# Patient Record
Sex: FEMALE | Race: WHITE | NOT HISPANIC OR LATINO | URBAN - METROPOLITAN AREA
[De-identification: names, ages, dates, MRNs, and addresses within clinical notes are randomized per-mention and may not be internally consistent; named-entity substitution may affect disease eponyms.]

---

## 2021-12-06 ENCOUNTER — INPATIENT (INPATIENT)
Facility: HOSPITAL | Age: 21
LOS: 1 days | Discharge: HOME | DRG: 392 | End: 2021-12-08
Attending: HOSPITALIST | Admitting: HOSPITALIST
Payer: MEDICAID

## 2021-12-06 VITALS
OXYGEN SATURATION: 98 % | DIASTOLIC BLOOD PRESSURE: 93 MMHG | SYSTOLIC BLOOD PRESSURE: 137 MMHG | TEMPERATURE: 98 F | HEART RATE: 132 BPM | RESPIRATION RATE: 18 BRPM

## 2021-12-06 LAB
ALBUMIN SERPL ELPH-MCNC: 3.8 G/DL — SIGNIFICANT CHANGE UP (ref 3.5–5)
ALP SERPL-CCNC: 84 U/L — SIGNIFICANT CHANGE UP (ref 40–120)
ALT FLD-CCNC: 19 U/L DA — SIGNIFICANT CHANGE UP (ref 10–60)
ANION GAP SERPL CALC-SCNC: 10 MMOL/L — SIGNIFICANT CHANGE UP (ref 5–17)
AST SERPL-CCNC: 11 U/L — SIGNIFICANT CHANGE UP (ref 10–40)
BASOPHILS # BLD AUTO: 0.03 K/UL — SIGNIFICANT CHANGE UP (ref 0–0.2)
BASOPHILS NFR BLD AUTO: 0.4 % — SIGNIFICANT CHANGE UP (ref 0–2)
BILIRUB SERPL-MCNC: 0.6 MG/DL — SIGNIFICANT CHANGE UP (ref 0.2–1.2)
BUN SERPL-MCNC: 13 MG/DL — SIGNIFICANT CHANGE UP (ref 7–18)
CALCIUM SERPL-MCNC: 9.2 MG/DL — SIGNIFICANT CHANGE UP (ref 8.4–10.5)
CHLORIDE SERPL-SCNC: 106 MMOL/L — SIGNIFICANT CHANGE UP (ref 96–108)
CO2 SERPL-SCNC: 23 MMOL/L — SIGNIFICANT CHANGE UP (ref 22–31)
CREAT SERPL-MCNC: 0.77 MG/DL — SIGNIFICANT CHANGE UP (ref 0.5–1.3)
D DIMER BLD IA.RAPID-MCNC: <150 NG/ML DDU — SIGNIFICANT CHANGE UP
EOSINOPHIL # BLD AUTO: 0.02 K/UL — SIGNIFICANT CHANGE UP (ref 0–0.5)
EOSINOPHIL NFR BLD AUTO: 0.3 % — SIGNIFICANT CHANGE UP (ref 0–6)
GLUCOSE SERPL-MCNC: 95 MG/DL — SIGNIFICANT CHANGE UP (ref 70–99)
HCG SERPL-ACNC: <1 MIU/ML — SIGNIFICANT CHANGE UP
HCT VFR BLD CALC: 40.4 % — SIGNIFICANT CHANGE UP (ref 34.5–45)
HGB BLD-MCNC: 14.3 G/DL — SIGNIFICANT CHANGE UP (ref 11.5–15.5)
IMM GRANULOCYTES NFR BLD AUTO: 0.1 % — SIGNIFICANT CHANGE UP (ref 0–1.5)
LIDOCAIN IGE QN: 67 U/L — LOW (ref 73–393)
LYMPHOCYTES # BLD AUTO: 1.61 K/UL — SIGNIFICANT CHANGE UP (ref 1–3.3)
LYMPHOCYTES # BLD AUTO: 22.4 % — SIGNIFICANT CHANGE UP (ref 13–44)
MCHC RBC-ENTMCNC: 30.5 PG — SIGNIFICANT CHANGE UP (ref 27–34)
MCHC RBC-ENTMCNC: 35.4 GM/DL — SIGNIFICANT CHANGE UP (ref 32–36)
MCV RBC AUTO: 86.1 FL — SIGNIFICANT CHANGE UP (ref 80–100)
MONOCYTES # BLD AUTO: 0.43 K/UL — SIGNIFICANT CHANGE UP (ref 0–0.9)
MONOCYTES NFR BLD AUTO: 6 % — SIGNIFICANT CHANGE UP (ref 2–14)
NEUTROPHILS # BLD AUTO: 5.08 K/UL — SIGNIFICANT CHANGE UP (ref 1.8–7.4)
NEUTROPHILS NFR BLD AUTO: 70.8 % — SIGNIFICANT CHANGE UP (ref 43–77)
NRBC # BLD: 0 /100 WBCS — SIGNIFICANT CHANGE UP (ref 0–0)
PLATELET # BLD AUTO: 253 K/UL — SIGNIFICANT CHANGE UP (ref 150–400)
POTASSIUM SERPL-MCNC: 3.5 MMOL/L — SIGNIFICANT CHANGE UP (ref 3.5–5.3)
POTASSIUM SERPL-SCNC: 3.5 MMOL/L — SIGNIFICANT CHANGE UP (ref 3.5–5.3)
PROT SERPL-MCNC: 7.9 G/DL — SIGNIFICANT CHANGE UP (ref 6–8.3)
RBC # BLD: 4.69 M/UL — SIGNIFICANT CHANGE UP (ref 3.8–5.2)
RBC # FLD: 11.6 % — SIGNIFICANT CHANGE UP (ref 10.3–14.5)
SODIUM SERPL-SCNC: 139 MMOL/L — SIGNIFICANT CHANGE UP (ref 135–145)
WBC # BLD: 7.18 K/UL — SIGNIFICANT CHANGE UP (ref 3.8–10.5)
WBC # FLD AUTO: 7.18 K/UL — SIGNIFICANT CHANGE UP (ref 3.8–10.5)

## 2021-12-06 PROCEDURE — 99285 EMERGENCY DEPT VISIT HI MDM: CPT

## 2021-12-06 PROCEDURE — 93010 ELECTROCARDIOGRAM REPORT: CPT

## 2021-12-06 RX ORDER — FAMOTIDINE 10 MG/ML
20 INJECTION INTRAVENOUS ONCE
Refills: 0 | Status: COMPLETED | OUTPATIENT
Start: 2021-12-06 | End: 2021-12-06

## 2021-12-06 RX ORDER — SODIUM CHLORIDE 9 MG/ML
1000 INJECTION INTRAMUSCULAR; INTRAVENOUS; SUBCUTANEOUS ONCE
Refills: 0 | Status: COMPLETED | OUTPATIENT
Start: 2021-12-06 | End: 2021-12-06

## 2021-12-06 RX ORDER — ONDANSETRON 8 MG/1
4 TABLET, FILM COATED ORAL ONCE
Refills: 0 | Status: COMPLETED | OUTPATIENT
Start: 2021-12-06 | End: 2021-12-06

## 2021-12-06 RX ADMIN — ONDANSETRON 4 MILLIGRAM(S): 8 TABLET, FILM COATED ORAL at 23:13

## 2021-12-06 RX ADMIN — FAMOTIDINE 20 MILLIGRAM(S): 10 INJECTION INTRAVENOUS at 23:13

## 2021-12-06 RX ADMIN — SODIUM CHLORIDE 1000 MILLILITER(S): 9 INJECTION INTRAMUSCULAR; INTRAVENOUS; SUBCUTANEOUS at 22:31

## 2021-12-06 NOTE — ED ADULT TRIAGE NOTE - CHIEF COMPLAINT QUOTE
as per pt I keep burping/regurgitating and my esophagus feels tight for the past 2-3 days,pt speaks in full sentences.

## 2021-12-06 NOTE — ED PROVIDER NOTE - CLINICAL SUMMARY MEDICAL DECISION MAKING FREE TEXT BOX
Will f/u diagnostics including xray of soft tissue of neck. Will possibly consult GI. Will f/u diagnostics including xray of soft tissue of neck. Will possibly consult GI.  2:55a- Pt with persistent esophageal foreign body sensation. Case dw Dr. Benson (GI) will consult for possible endoscopy.  MAR endorsed. Pt agrees with admission to hospitalist service.  I had a detailed discussion with the patient and/or guardian regarding the historical points, exam findings, and any diagnostic results supporting the admit diagnosis.

## 2021-12-06 NOTE — ED ADULT NURSE NOTE - OBJECTIVE STATEMENT
Patient presented to the ED with c/o  burping, regurgitating and pressure feeling in the throat. As per patient she took Omeprazole before coming to the ED. Patient presented to the ED with c/o burping, regurgitating and pressure feeling in the throat. As per patient she took Omeprazole before coming to the ED.

## 2021-12-06 NOTE — ED ADULT NURSE NOTE - HOW OFTEN DO YOU HAVE A DRINK CONTAINING ALCOHOL?
2793 Bedside and Verbal shift change report given to Tim Hurtado RN (oncoming nurse) by Dayron Porter RN (offgoing nurse). Report included the following information SBAR, Kardex, Intake/Output, MAR and Recent Results. 0710 Epidural line removed at this time. 7773 Patient ambulatory to the bathroom able to void 400ml. 0935 TRANSFER - OUT REPORT:    Verbal report given to MATHEW Graves RN (name) on Carissa Bennett  being transferred to [facility]U (unit) for routine progression of care       Report consisted of patients Situation, Background, Assessment and   Recommendations(SBAR). Information from the following report(s) SBAR, Kardex, Procedure Summary, Intake/Output and MAR was reviewed with the receiving nurse. Lines:   Peripheral IV 11/25/20 Right Forearm (Active)   Site Assessment Clean, dry, & intact 11/27/20 0708   Phlebitis Assessment 0 11/27/20 0708   Infiltration Assessment 0 11/27/20 0708   Dressing Status Clean, dry, & intact 11/27/20 0708   Dressing Type Tape;Transparent 11/27/20 0708   Hub Color/Line Status Infusing 11/27/20 0708        Opportunity for questions and clarification was provided.       Patient transported with:   Registered Nurse Never

## 2021-12-06 NOTE — ED PROVIDER NOTE - OBJECTIVE STATEMENT
20 y/o female (mother present) presents with chief complaint of foreign body sensation to throat x 2 days after eating steak. No chest pain. No shortness of breath. Pt describes having difficulty swallowing liquids and solids, associated with nausea. No vomiting. Pt states was seen by GI yesterday and was prescribed omeprazole, however symptoms are not resolved.

## 2021-12-07 ENCOUNTER — TRANSCRIPTION ENCOUNTER (OUTPATIENT)
Age: 21
End: 2021-12-07

## 2021-12-07 ENCOUNTER — RESULT REVIEW (OUTPATIENT)
Age: 21
End: 2021-12-07

## 2021-12-07 DIAGNOSIS — R10.13 EPIGASTRIC PAIN: ICD-10-CM

## 2021-12-07 DIAGNOSIS — R09.89 OTHER SPECIFIED SYMPTOMS AND SIGNS INVOLVING THE CIRCULATORY AND RESPIRATORY SYSTEMS: ICD-10-CM

## 2021-12-07 DIAGNOSIS — K20.0 EOSINOPHILIC ESOPHAGITIS: ICD-10-CM

## 2021-12-07 DIAGNOSIS — R53.83 OTHER FATIGUE: ICD-10-CM

## 2021-12-07 DIAGNOSIS — Z29.9 ENCOUNTER FOR PROPHYLACTIC MEASURES, UNSPECIFIED: ICD-10-CM

## 2021-12-07 DIAGNOSIS — K22.9 DISEASE OF ESOPHAGUS, UNSPECIFIED: ICD-10-CM

## 2021-12-07 DIAGNOSIS — R13.10 DYSPHAGIA, UNSPECIFIED: ICD-10-CM

## 2021-12-07 LAB
ALBUMIN SERPL ELPH-MCNC: 3.7 G/DL — SIGNIFICANT CHANGE UP (ref 3.5–5)
ALP SERPL-CCNC: 73 U/L — SIGNIFICANT CHANGE UP (ref 40–120)
ALT FLD-CCNC: 18 U/L DA — SIGNIFICANT CHANGE UP (ref 10–60)
AMYLASE P1 CFR SERPL: 73 U/L — SIGNIFICANT CHANGE UP (ref 25–115)
ANION GAP SERPL CALC-SCNC: 7 MMOL/L — SIGNIFICANT CHANGE UP (ref 5–17)
APPEARANCE UR: CLEAR — SIGNIFICANT CHANGE UP
APTT BLD: 32 SEC — SIGNIFICANT CHANGE UP (ref 27.5–35.5)
AST SERPL-CCNC: 10 U/L — SIGNIFICANT CHANGE UP (ref 10–40)
BILIRUB SERPL-MCNC: 0.6 MG/DL — SIGNIFICANT CHANGE UP (ref 0.2–1.2)
BILIRUB UR-MCNC: NEGATIVE — SIGNIFICANT CHANGE UP
BUN SERPL-MCNC: 10 MG/DL — SIGNIFICANT CHANGE UP (ref 7–18)
CALCIUM SERPL-MCNC: 8.8 MG/DL — SIGNIFICANT CHANGE UP (ref 8.4–10.5)
CHLORIDE SERPL-SCNC: 109 MMOL/L — HIGH (ref 96–108)
CO2 SERPL-SCNC: 24 MMOL/L — SIGNIFICANT CHANGE UP (ref 22–31)
COLOR SPEC: YELLOW — SIGNIFICANT CHANGE UP
CREAT SERPL-MCNC: 0.66 MG/DL — SIGNIFICANT CHANGE UP (ref 0.5–1.3)
DIFF PNL FLD: NEGATIVE — SIGNIFICANT CHANGE UP
GLUCOSE SERPL-MCNC: 83 MG/DL — SIGNIFICANT CHANGE UP (ref 70–99)
GLUCOSE UR QL: NEGATIVE — SIGNIFICANT CHANGE UP
HCT VFR BLD CALC: 38 % — SIGNIFICANT CHANGE UP (ref 34.5–45)
HGB BLD-MCNC: 13.2 G/DL — SIGNIFICANT CHANGE UP (ref 11.5–15.5)
INR BLD: 1.19 RATIO — HIGH (ref 0.88–1.16)
KETONES UR-MCNC: ABNORMAL
LEUKOCYTE ESTERASE UR-ACNC: NEGATIVE — SIGNIFICANT CHANGE UP
MCHC RBC-ENTMCNC: 30.6 PG — SIGNIFICANT CHANGE UP (ref 27–34)
MCHC RBC-ENTMCNC: 34.7 GM/DL — SIGNIFICANT CHANGE UP (ref 32–36)
MCV RBC AUTO: 88 FL — SIGNIFICANT CHANGE UP (ref 80–100)
NITRITE UR-MCNC: NEGATIVE — SIGNIFICANT CHANGE UP
NRBC # BLD: 0 /100 WBCS — SIGNIFICANT CHANGE UP (ref 0–0)
PH UR: 5 — SIGNIFICANT CHANGE UP (ref 5–8)
PLATELET # BLD AUTO: 235 K/UL — SIGNIFICANT CHANGE UP (ref 150–400)
POTASSIUM SERPL-MCNC: 3.7 MMOL/L — SIGNIFICANT CHANGE UP (ref 3.5–5.3)
POTASSIUM SERPL-SCNC: 3.7 MMOL/L — SIGNIFICANT CHANGE UP (ref 3.5–5.3)
PROT SERPL-MCNC: 7.1 G/DL — SIGNIFICANT CHANGE UP (ref 6–8.3)
PROT UR-MCNC: 15
PROTHROM AB SERPL-ACNC: 14 SEC — HIGH (ref 10.6–13.6)
RBC # BLD: 4.32 M/UL — SIGNIFICANT CHANGE UP (ref 3.8–5.2)
RBC # FLD: 11.5 % — SIGNIFICANT CHANGE UP (ref 10.3–14.5)
SARS-COV-2 RNA SPEC QL NAA+PROBE: SIGNIFICANT CHANGE UP
SODIUM SERPL-SCNC: 140 MMOL/L — SIGNIFICANT CHANGE UP (ref 135–145)
SP GR SPEC: 1.02 — SIGNIFICANT CHANGE UP (ref 1.01–1.02)
TSH SERPL-MCNC: 1.3 UU/ML — SIGNIFICANT CHANGE UP (ref 0.34–4.82)
UROBILINOGEN FLD QL: NEGATIVE — SIGNIFICANT CHANGE UP
WBC # BLD: 8.17 K/UL — SIGNIFICANT CHANGE UP (ref 3.8–10.5)
WBC # FLD AUTO: 8.17 K/UL — SIGNIFICANT CHANGE UP (ref 3.8–10.5)

## 2021-12-07 PROCEDURE — 43239 EGD BIOPSY SINGLE/MULTIPLE: CPT

## 2021-12-07 PROCEDURE — 70360 X-RAY EXAM OF NECK: CPT | Mod: 26

## 2021-12-07 PROCEDURE — 88312 SPECIAL STAINS GROUP 1: CPT | Mod: 26

## 2021-12-07 PROCEDURE — 99222 1ST HOSP IP/OBS MODERATE 55: CPT | Mod: 25

## 2021-12-07 PROCEDURE — 99223 1ST HOSP IP/OBS HIGH 75: CPT | Mod: 25

## 2021-12-07 PROCEDURE — 88305 TISSUE EXAM BY PATHOLOGIST: CPT | Mod: 26

## 2021-12-07 RX ORDER — PANTOPRAZOLE SODIUM 20 MG/1
40 TABLET, DELAYED RELEASE ORAL DAILY
Refills: 0 | Status: DISCONTINUED | OUTPATIENT
Start: 2021-12-07 | End: 2021-12-07

## 2021-12-07 RX ORDER — SODIUM CHLORIDE 9 MG/ML
1000 INJECTION INTRAMUSCULAR; INTRAVENOUS; SUBCUTANEOUS
Refills: 0 | Status: DISCONTINUED | OUTPATIENT
Start: 2021-12-07 | End: 2021-12-07

## 2021-12-07 RX ORDER — SODIUM CHLORIDE 9 MG/ML
1000 INJECTION INTRAMUSCULAR; INTRAVENOUS; SUBCUTANEOUS
Refills: 0 | Status: DISCONTINUED | OUTPATIENT
Start: 2021-12-07 | End: 2021-12-08

## 2021-12-07 RX ORDER — METOCLOPRAMIDE HCL 10 MG
10 TABLET ORAL ONCE
Refills: 0 | Status: COMPLETED | OUTPATIENT
Start: 2021-12-07 | End: 2021-12-07

## 2021-12-07 RX ORDER — PANTOPRAZOLE SODIUM 20 MG/1
40 TABLET, DELAYED RELEASE ORAL
Refills: 0 | Status: DISCONTINUED | OUTPATIENT
Start: 2021-12-07 | End: 2021-12-08

## 2021-12-07 RX ORDER — PANTOPRAZOLE SODIUM 20 MG/1
1 TABLET, DELAYED RELEASE ORAL
Qty: 30 | Refills: 0
Start: 2021-12-07 | End: 2021-12-21

## 2021-12-07 RX ADMIN — SODIUM CHLORIDE 75 MILLILITER(S): 9 INJECTION INTRAMUSCULAR; INTRAVENOUS; SUBCUTANEOUS at 17:44

## 2021-12-07 RX ADMIN — SODIUM CHLORIDE 75 MILLILITER(S): 9 INJECTION INTRAMUSCULAR; INTRAVENOUS; SUBCUTANEOUS at 10:10

## 2021-12-07 RX ADMIN — Medication 10 MILLIGRAM(S): at 10:33

## 2021-12-07 RX ADMIN — SODIUM CHLORIDE 75 MILLILITER(S): 9 INJECTION INTRAMUSCULAR; INTRAVENOUS; SUBCUTANEOUS at 21:47

## 2021-12-07 RX ADMIN — PANTOPRAZOLE SODIUM 40 MILLIGRAM(S): 20 TABLET, DELAYED RELEASE ORAL at 21:45

## 2021-12-07 RX ADMIN — PANTOPRAZOLE SODIUM 40 MILLIGRAM(S): 20 TABLET, DELAYED RELEASE ORAL at 13:18

## 2021-12-07 NOTE — DISCHARGE NOTE PROVIDER - NSDCCPCAREPLAN_GEN_ALL_CORE_FT
PRINCIPAL DISCHARGE DIAGNOSIS  Diagnosis: Eosinophilic esophagitis  Assessment and Plan of Treatment: You came into the hospital complaining about not being able to swallow properly and having the sensation of something being stuck. We had the GI specialist come in and see you. You had a EGD perfmormed and they took biopsies. Please follow up with them on discharge. Also follow up with your primary care doctor on discharge.       PRINCIPAL DISCHARGE DIAGNOSIS  Diagnosis: Eosinophilic esophagitis  Assessment and Plan of Treatment: You came into the hospital complaining about not being able to swallow properly and having the sensation of something being stuck. We had the GI specialist come in and see you. You had a EGD perfmormed and they took biopsies. Please follow up with Dr Kelley MOTLEY  on discharge. You are recommended to continue on pantoprazole twice daily Also follow up with your primary care doctor on discharge. You may need to see allergist as out patient .       PRINCIPAL DISCHARGE DIAGNOSIS  Diagnosis: Eosinophilic esophagitis  Assessment and Plan of Treatment: You came into the hospital complaining about not being able to swallow properly and having the sensation of something being stuck. We had the GI specialist come in and see you. You had a EGD perfmormed and they took biopsies. Please follow up with Dr Kelley MOTLEY  on discharge. You are recommended to continue on pantoprazole twice daily Also follow up with your primary care doctor on discharge. You may need to see allergist as out patient .      SECONDARY DISCHARGE DIAGNOSES  Diagnosis: Dysphagia  Assessment and Plan of Treatment: You came into the hospital complaining about not being able to swallow properly and having the sensation of something being stuck. We had the GI specialist come in and see you. You had a EGD perfmormed and they took biopsies. Please follow up with Dr Kelley MOTLEY  on discharge. You are recommended to continue on pantoprazole twice daily Also follow up with your primary care doctor on discharge. You may need to see allergist as out patient .

## 2021-12-07 NOTE — PROGRESS NOTE ADULT - PROBLEM SELECTOR PLAN 2
c/o dysphagia to both solids and liquids x 2d after eating steak, no improvement on omeprazole  c/o chest discomfort with swallowing food, decrease appetite, and nausea   prior episodes in the past 2/2 ingesting irritating foods, sensitive to plums, apples, eggs, nuts, and seeds  In ED: EKG nsr, Electrolytes wnl, no leukocytosis s/p 1 dose Zofran and Pepsid   Physical Exam: No swollen tonsils or exudates. No shotty LN  CXR shows no foreign body, but shows slight narrowing upper 1/3 esophagus   GI Dr. Benson consulted, per ED provider   Endoscopy as above.   c/w pantoprazole BID

## 2021-12-07 NOTE — H&P ADULT - PROBLEM SELECTOR PLAN 4
IMPROVE VTE Individual Risk Assessment  RISK                                                                Points  [  ] Previous VTE                                                  3  [  ] Thrombophilia                                               2  [  ] Lower limb paralysis                                      2        (unable to hold up >15 seconds)    [  ] Current Cancer                                              2         (within 6 months)  [  ] Immobilization > 24 hrs                                1  [  ] ICU/CCU stay > 24 hours                              1  [  ] Age > 60                                                      1  IMPROVE VTE Score ____0_____    Hold DVT ppx for EGD in the AM   Pantoprazole IV daily for GI ppx IMPROVE VTE Individual Risk Assessment  RISK                                                                Points  [  ] Previous VTE                                                  3  [  ] Thrombophilia                                               2  [  ] Lower limb paralysis                                      2        (unable to hold up >15 seconds)    [  ] Current Cancer                                              2         (within 6 months)  [ x ] Immobilization > 24 hrs                                1  [  ] ICU/CCU stay > 24 hours                              1  [  ] Age > 60                                                      1  IMPROVE VTE Score ____1____    Hold DVT ppx for EGD in the AM   Pantoprazole IV daily for GI ppx c/o fatigue and anxiety with notable chills  denies weight loss, fevers, and night sweats  no history of anxiety, not taking any medications  f/u TSH

## 2021-12-07 NOTE — DISCHARGE NOTE PROVIDER - HOSPITAL COURSE
Patient is a 21 year female coming from home lives with family with no significant PMHx presents with difficulty swallowing and dyspepsia with nausea for x 2 days. Patient states that she felt really nervous after eating a small-bite sized, shredded piece of steak, denies sharp pieces or jagged edges. Patient endorses feeling as if she still has a lump in her throat, however states she swallowed the whole piece. In addition to esophageal discomfort, she notes a decrease in appetite to solid and liquid foods stating that when she eats she has associated throat and chest discomfort as the food goes down with reflux and burping that follows, which improves when she is sitting upright, denies cough and vomiting. Patient states previous episodes of this happening after eating irritating foods to which she notes she has has many food sensitivities. Of note, patient also endorses feeling tired recently with increased feelings of anxiety with chills, but denies fevers. Patient denies HA, changes in vision, chest pain, SOB, abdominal pain, or changes in urination or bowel movements.        Patient is a 21 year female coming from home lives with family with no significant PMHx presents with difficulty swallowing and dyspepsia with nausea for x 2 days. Patient states that she felt really nervous after eating a small-bite sized, shredded piece of steak, denies sharp pieces or jagged edges. Patient endorses feeling as if she still has a lump in her throat, however states she swallowed the whole piece. In addition to esophageal discomfort, she notes a decrease in appetite to solid and liquid foods stating that when she eats she has associated throat and chest discomfort as the food goes down with reflux and burping that follows, which improves when she is sitting upright, denies cough and vomiting. Patient states previous episodes of this happening after eating irritating foods to which she notes she has has many food sensitivities. Of note, patient also endorses feeling tired recently with increased feelings of anxiety with chills, but denies fevers. Patient denies HA, changes in vision, chest pain, SOB, abdominal pain, or changes in urination or bowel movements. She had an EGD performed which showed:   Esophagus Mucosa There were diffuse longitudinal linear furrows, friability, and    concentric rings throughout the esophagus suspicious for eosinophilic    esophagitis. There was mild stricturing, particularly at 20 cm from the incisors    with moderate resistance encountered with an upper endoscope. An XP scope was    used for the rest of the exam. Biopsies were taken from the proximal esophagus    with a cold forceps for histology.     Stomach Normal stomach.     Duodenum Normal duodenum.     Patient is stable for discharge. Patient has been advised to follow up as outpatient. Case has been discussed with the attending. This is just a summary of the case. For further information, please refer to patient chart document.

## 2021-12-07 NOTE — H&P ADULT - ASSESSMENT
Patient is a 21 year female coming from home lives with family with no significant PMHx presents with difficulty swallowing and dyspepsia with nausea for x 2 days. Pt is admitted for dysphagia possible esophageal foreign body for EGD eval.

## 2021-12-07 NOTE — CONSULT NOTE ADULT - ATTENDING COMMENTS
Patient seen and examined. 21F presenting for globus sensation. States ate steak 2 days ago and since, has had some globus sensation. Was able to tolerate solid food (cereal) and went down but felt gas/burping, no vomiting. Appears comfortable on exam, tolerating secretions, anxious but no distress. Abd exam benign, soft, NTND, no crepitus on exam. Ddx includes esophagitis, stricture, EOE, etc. Given ability to tolerate solid food post ingestion, lower suspicion for ongoing food impaction. Maintain NPO. PPI IV BID. EGD today.

## 2021-12-07 NOTE — H&P ADULT - ATTENDING COMMENTS
Pt seen at bedside.  Case discussed with MAR.  21 year old lady with no PMH of note Pt seen at bedside.  Case discussed with MAR.  21 year old lady with no PMH of note except fro remote hx of dysphagia presenting with chest discomfort with swallowing over the last 2 days. This started after a meal of shredded steak meat, Denies SOB but continues to experience a FB sensation in her throat.    Vital Signs Last 24 Hrs  T(C): 36.6 (07 Dec 2021 05:00), Max: 36.9 (06 Dec 2021 21:45)  T(F): 97.9 (07 Dec 2021 05:00), Max: 98.5 (06 Dec 2021 21:45)  HR: 80 (07 Dec 2021 05:00) (80 - 132)  BP: 119/79 (07 Dec 2021 05:00) (112/75 - 137/93)  RR: 18 (07 Dec 2021 05:00) (18 - 18)  SpO2: 99% (07 Dec 2021 05:00) (98% - 99%)    Labs                        13.2   8.17  )-----------( 235      ( 07 Dec 2021 05:52 )             38.0   INR 1.19    12-07    140  |  109<H>  |  10  ----------------------------<  83  3.7   |  24  |  0.66    Ca    8.8      07 Dec 2021 05:52    TPro  7.1  /  Alb  3.7  /  TBili  0.6  /  DBili  x   /  AST  10  /  ALT  18  /  AlkPhos  73  12-07    Neck X ray - anterior /lateral - pending final read    Impression   - Suspected Foreign body impaction in the esophagus  - Dysphagia and odynophagia  - Concern for esophageal rings etc    Plan   - Admit to Medicine for GI evaluation   - NO respiratory distress requiring acute intervention  - GI consult DR Benson consulted by the ED team  - Monitor closely

## 2021-12-07 NOTE — PROGRESS NOTE ADULT - NUTRITIONAL ASSESSMENT
Diet, NPO:   NPO for Procedure/Test     NPO Start Date: 07-Dec-2021,   NPO Start Time: 05:00  Except Medications  With Ice Chips/Sips of Water (12-07-21 @ 05:03) [Active]

## 2021-12-07 NOTE — H&P ADULT - NSHPSOCIALHISTORY_GEN_ALL_CORE
Patient lives at home with family, ambulates independently. Mother is at the bedside.    Patient denies smoking history.  Patient denies ETOH use.  Patient denies illicit use of drugs.

## 2021-12-07 NOTE — DISCHARGE NOTE PROVIDER - CARE PROVIDER_API CALL
Evgeny Benson)  Gastroenterology; Internal Medicine  [address] [facility] [address] [place] [address]  [place] [address]  Phone: [phone]  Fax: [phone]  Follow Up Time:

## 2021-12-07 NOTE — H&P ADULT - PROBLEM SELECTOR PLAN 3
c/o fatigue and anxiety with notable chills  denies weight loss, fevers, and night sweats  no history of anxiety, not taking any medications  f/u TSH management as above

## 2021-12-07 NOTE — H&P ADULT - PROBLEM SELECTOR PLAN 5
IMPROVE VTE Individual Risk Assessment  RISK                                                                Points  [  ] Previous VTE                                                  3  [  ] Thrombophilia                                               2  [  ] Lower limb paralysis                                      2        (unable to hold up >15 seconds)    [  ] Current Cancer                                              2         (within 6 months)  [ x ] Immobilization > 24 hrs                                1  [  ] ICU/CCU stay > 24 hours                              1  [  ] Age > 60                                                      1  IMPROVE VTE Score ____1____    Hold DVT ppx for EGD in the AM   Pantoprazole IV daily for GI ppx

## 2021-12-07 NOTE — DISCHARGE NOTE PROVIDER - NSDCMRMEDTOKEN_GEN_ALL_CORE_FT
pantoprazole 40 mg oral delayed release tablet: 1 tab(s) orally 2 times a day    pantoprazole 40 mg oral delayed release tablet: 1 tab(s) orally 2 times a day   Reglan 10 mg oral tablet: 1 tab(s) orally 2 times a day

## 2021-12-07 NOTE — H&P ADULT - PROBLEM SELECTOR PLAN 2
management as above c/o dysphagia to both solids and liquids x 2d after eating steak, no improvement on omeprazole  c/o chest discomfort with swallowing food, decrease appetite, and nausea   prior episodes in the past 2/2 ingesting irritating foods, sensitive to plums, apples, eggs, nuts, and seeds  In ED: EKG nsr, Electrolytes wnl, no leukocytosis s/p 1 dose Zofran and Pepsid   Physical Exam: No swollen tonsils or exudates. No shotty LN  CXR shows no foreign body, but shows slight narrowing upper 1/3 esophagus (f/u official read)  GI Dr. Benson consulted, per ED provider   Possible Endoscopy in AM, will remain NPO  c/w pantoprazole IV push daily

## 2021-12-07 NOTE — H&P ADULT - NSHPPHYSICALEXAM_GEN_ALL_CORE
Vital Signs Last 24 Hrs  T(C): 36.6 (07 Dec 2021 05:00), Max: 36.9 (06 Dec 2021 21:45)  T(F): 97.9 (07 Dec 2021 05:00), Max: 98.5 (06 Dec 2021 21:45)  HR: 80 (07 Dec 2021 05:00) (80 - 132)  BP: 119/79 (07 Dec 2021 05:00) (112/75 - 137/93)  BP(mean): --  RR: 18 (07 Dec 2021 05:00) (18 - 18)  SpO2: 99% (07 Dec 2021 05:00) (98% - 99%)      GENERAL: NAD, lying in bed comfortably  HEAD:  Atraumatic, Normocephalic  EYES: EOMI, PERRLA, conjunctiva and sclera clear  ENT: Moist mucous membranes. No shotty LN. No enlarged tonsils or exudates.  NECK: Supple, No JVD. No Goiter.  CHEST/LUNG: Clear to auscultation bilaterally. Unlabored respirations  HEART: Regular rate and rhythm; No murmurs, rubs, or gallops  ABDOMEN: Bowel sounds present; Soft, Nontender, Nondistended.  EXTREMITIES:  2+ Peripheral Pulses, brisk capillary refill. No clubbing, cyanosis, or edema  NERVOUS SYSTEM:  Alert & Oriented X3, speech clear. No deficits   MSK: FROM all 4 extremities, full and equal strength  SKIN: No rashes or lesions Vital Signs Last 24 Hrs  T(C): 36.6 (07 Dec 2021 05:00), Max: 36.9 (06 Dec 2021 21:45)  T(F): 97.9 (07 Dec 2021 05:00), Max: 98.5 (06 Dec 2021 21:45)  HR: 80 (07 Dec 2021 05:00) (80 - 132)  BP: 119/79 (07 Dec 2021 05:00) (112/75 - 137/93)  BP(mean): --  RR: 18 (07 Dec 2021 05:00) (18 - 18)  SpO2: 99% (07 Dec 2021 05:00) (98% - 99%)      GENERAL: NAD, lying in bed comfortably  HEAD:  Atraumatic, Normocephalic  EYES: EOMI, PERRLA, conjunctiva and sclera clear  ENT: Moist mucous membranes. No shotty LN. No enlarged tonsils or exudates.  NECK: Supple, No JVD. No Goiter.  CHEST/LUNG: mild rhonchi in lower lobes bilaterally. Unlabored respirations  HEART: Regular rate and rhythm; No murmurs, rubs, or gallops  ABDOMEN: Bowel sounds present; Soft, Nontender, Nondistended.  EXTREMITIES:  2+ Peripheral Pulses, brisk capillary refill. No clubbing, cyanosis, or edema  NERVOUS SYSTEM:  Alert & Oriented X3, speech clear. No deficits   MSK: FROM all 4 extremities, full and equal strength  SKIN: No rashes or lesions Vital Signs Last 24 Hrs  T(C): 36.6 (07 Dec 2021 05:00), Max: 36.9 (06 Dec 2021 21:45)  T(F): 97.9 (07 Dec 2021 05:00), Max: 98.5 (06 Dec 2021 21:45)  HR: 80 (07 Dec 2021 05:00) (80 - 132)  BP: 119/79 (07 Dec 2021 05:00) (112/75 - 137/93)  BP(mean): --  RR: 18 (07 Dec 2021 05:00) (18 - 18)  SpO2: 99% (07 Dec 2021 05:00) (98% - 99%)      GENERAL: NAD, lying in bed comfortably  HEAD:  Atraumatic, Normocephalic  EYES: EOMI, PERRLA, conjunctiva and sclera clear  ENT: Moist mucous membranes. No shotty LN. No enlarged tonsils or exudates.  NECK: Supple, No JVD. No Goiter.  CHEST/LUNG: clear lungs bilaterally. Unlabored respirations  HEART: Regular rate and rhythm; No murmurs, rubs, or gallops  ABDOMEN: Bowel sounds present; Soft, Nontender, Nondistended.  EXTREMITIES:  2+ Peripheral Pulses, brisk capillary refill. No clubbing, cyanosis, or edema  NERVOUS SYSTEM:  Alert & Oriented X3, speech clear. No deficits   MSK: FROM all 4 extremities, full and equal strength  SKIN: No rashes or lesions

## 2021-12-07 NOTE — PATIENT PROFILE ADULT - FALL HARM RISK - UNIVERSAL INTERVENTIONS
Bed in lowest position, wheels locked, appropriate side rails in place/Call bell, personal items and telephone in reach/Instruct patient to call for assistance before getting out of bed or chair/Non-slip footwear when patient is out of bed/[place] to call system/Physically safe environment - no spills, clutter or unnecessary equipment/Purposeful Proactive Rounding/Room/bathroom lighting operational, light cord in reach

## 2021-12-07 NOTE — H&P ADULT - HISTORY OF PRESENT ILLNESS
Patient is a 21 year female coming from home lives with family with no significant PMHx presents with difficulty swallowing and dyspepsia with nausea for x 2 days. Patient states that she felt really nervous after eating a small-bite sized, shredded piece of steak, denies sharp pieces or jagged edges. Patient endorses feeling as if she still has a lump in her throat, however states she swallowed the whole piece. In addition to esophageal discomfort, she notes a decrease in appetite to solid and liquid foods stating that when she eats she has associated throat and chest discomfort as the food goes down with reflux and burping that follows, which improves when she is sitting upright, denies cough and vomiting. Patient states previous episodes of this happening after eating irritating foods to which she notes she has has many food sensitivities. Of note, patient also endorses feeling tired recently with increased feelings of anxiety with chills, but denies fevers. Patient denies HA, changes in vision, chest pain, SOB, abdominal pain, or changes in urination or bowel movements.

## 2021-12-07 NOTE — CONSULT NOTE ADULT - SUBJECTIVE AND OBJECTIVE BOX
[facility] GI CONSULTATION    Patient is a 21y old  Female who presents with a chief complaint of Dysphagia (07 Dec 2021 03:50)    HPI:  Patient is a 21 year female coming from home lives with family with no significant PMHx presents with difficulty swallowing and dyspepsia with nausea for x 2 days. Patient states that she felt really nervous after eating a small-bite sized, shredded piece of steak, denies sharp pieces or jagged edges. Patient endorses feeling as if she still has a lump in her throat, however states she swallowed the whole piece. In addition to esophageal discomfort, she notes a decrease in appetite to solid and liquid foods stating that when she eats she has associated throat and chest discomfort as the food goes down with reflux and burping that follows, which improves when she is sitting upright, denies cough and vomiting. Patient states previous episodes of this happening after eating irritating foods to which she notes she has has many food sensitivities. Of note, patient also endorses feeling tired recently with increased feelings of anxiety with chills, but denies fevers. Patient denies HA, changes in vision, chest pain, SOB, abdominal pain, or changes in urination or bowel movements.      (07 Dec 2021 03:50)    PMH/PSH:  PAST MEDICAL & SURGICAL HISTORY:  No pertinent past medical history      FH:  FAMILY HISTORY:      MEDS:  MEDICATIONS  (STANDING):  pantoprazole  Injectable 40 milliGRAM(s) IV Push daily    MEDICATIONS  (PRN):    Allergies    Nuts (Rash)  sulfa (Rash)    Intolerances            CONSTITUTIONAL:  No weight loss, fever, chills, weakness or fatigue.  HEENT:  Eyes:  No visual loss, blurred vision, double vision or yellow sclerae. Ears, Nose, Throat:  No hearing loss, sneezing, congestion, runny nose or sore throat.  SKIN:  No rash or itching.  CARDIOVASCULAR:  No chest pain, chest pressure or chest discomfort. No palpitations or edema.  RESPIRATORY:  No shortness of breath, cough or sputum.  GASTROINTESTINAL:  SEE HPI  GENITOURINARY:  No dysuria, hematuria, urinary frequency  NEUROLOGICAL:  No headache, dizziness, syncope, paralysis, ataxia, numbness or tingling in the extremities. No change in bowel or bladder control.  MUSCULOSKELETAL:  No muscle, back pain, joint pain or stiffness.  HEMATOLOGIC:  No anemia, bleeding or bruising.  LYMPHATICS:  No enlarged nodes. No history of splenectomy.  PSYCHIATRIC:  No history of depression or anxiety.  ENDOCRINOLOGIC:  No reports of sweating, cold or heat intolerance. No polyuria or polydipsia.    GI HPI: Patient seen and examined lying in bed in NAD. Denies chest pain, dizziness, SOB or palpitations. No abdominal pain. + nausea. No vomiting. Patient reports a feeling of pressure like sensation in her throat. She states she had an endoscopy about three years ago and thinks she was found to have EOE. She was recently started on Omeprazole.    ______________________________________________________________________  PHYSICAL EXAM:  T(C): 36.7 (12-07-21 @ 07:15), Max: 36.9 (12-06-21 @ 21:45)  HR: 86 (12-07-21 @ 07:15)  BP: 104/62 (12-07-21 @ 07:15)  RR: 18 (12-07-21 @ 07:15)  SpO2: 97% (12-07-21 @ 07:15)  Wt(kg): --      GEN: NAD, normocephalic  CVS: S1S2+  CHEST: clear to auscultation  ABD: soft , nontender, nondistended, bowel sounds present, no rebound, no guarding  EXTR: no cyanosis, no clubbing, no edema  NEURO: Awake and alert; non-focal   SKIN:  warm;  non icteric    ______________________________________________________________________  LABS:                        13.2   8.17  )-----------( 235      ( 07 Dec 2021 05:52 )             38.0     12-07    140  |  109<H>  |  10  ----------------------------<  83  3.7   |  24  |  0.66    Ca    8.8      07 Dec 2021 05:52    TPro  7.1  /  Alb  3.7  /  TBili  0.6  /  DBili  x   /  AST  10  /  ALT  18  /  AlkPhos  73  12-07    LIVER FUNCTIONS - ( 07 Dec 2021 05:52 )  Alb: 3.7 g/dL / Pro: 7.1 g/dL / ALK PHOS: 73 U/L / ALT: 18 U/L DA / AST: 10 U/L / GGT: x           PT/INR - ( 07 Dec 2021 05:52 )   PT: 14.0 sec;   INR: 1.19 ratio         PTT - ( 07 Dec 2021 05:52 )  PTT:32.0 sec

## 2021-12-07 NOTE — DISCHARGE NOTE PROVIDER - ATTENDING COMMENTS
tolerating po well. spoke to patient and her mother at bedside and explained in detail all care treatment and further plan  time spent-35 minutes

## 2021-12-07 NOTE — CONSULT NOTE ADULT - ASSESSMENT
GI asked to evaluate this 21 year female with no significant past medical or surgical history presents with difficulty swallowing and dyspepsia with nausea for x 2 days. Patient states she has a pressure like sensation in her throat since eating steak a couple of days ago.

## 2021-12-07 NOTE — H&P ADULT - PROBLEM SELECTOR PLAN 1
c/o dysphagia to both solids and liquids x 2d after eating steak, no improvement on omeprazole  c/o chest discomfort with swallowing food, decrease appetite, and nausea   prior episodes in the past 2/2 ingesting irritating foods, sensitive to plums, apples, eggs, nuts, and seeds  In ED: EKG nsr, Electrolytes wnl, no leukocytosis s/p 1 dose Zofran and Pepsid   Physical Exam: No swollen tonsils or exudates. No shotty LN  CXR shows no foreign body, but shows slight narrowing upper 1/3 esophagus (f/u official read)  GI Dr. Benson consulted, per ED provider   Endoscopy in AM, will remain NPO  c/w pantoprazole IV push daily c/o dysphagia to both solids and liquids x 2d after eating steak, no improvement on omeprazole  c/o chest discomfort with swallowing food, decrease appetite, and nausea   prior episodes in the past 2/2 ingesting irritating foods, sensitive to plums, apples, eggs, nuts, and seeds  In ED: EKG nsr, Electrolytes wnl, no leukocytosis s/p 1 dose Zofran and Pepsid   Physical Exam: No swollen tonsils or exudates. No shotty LN  CXR shows no foreign body, but shows slight narrowing upper 1/3 esophagus (f/u official read)  GI Dr. Benson consulted, per ED provider   Possible Endoscopy in AM, will remain NPO  c/w pantoprazole IV push daily

## 2021-12-07 NOTE — CONSULT NOTE ADULT - PROBLEM SELECTOR RECOMMENDATION 9
+ dysphagia  keep NPO  c/w PPI  plan for EGD today + dysphagia/globus sensation  keep NPO  c/w PPI  plan for EGD today

## 2021-12-07 NOTE — H&P ADULT - NSHPLABSRESULTS_GEN_ALL_CORE
12-06    139  |  106  |  13  ----------------------------<  95  3.5   |  23  |  0.77    Ca    9.2      06 Dec 2021 22:40    TPro  7.9  /  Alb  3.8  /  TBili  0.6  /  DBili  x   /  AST  11  /  ALT  19  /  AlkPhos  84  12-06                            14.3   7.18  )-----------( 253      ( 06 Dec 2021 22:40 )             40.4

## 2021-12-08 ENCOUNTER — TRANSCRIPTION ENCOUNTER (OUTPATIENT)
Age: 21
End: 2021-12-08

## 2021-12-08 VITALS
DIASTOLIC BLOOD PRESSURE: 68 MMHG | SYSTOLIC BLOOD PRESSURE: 107 MMHG | HEART RATE: 74 BPM | TEMPERATURE: 98 F | RESPIRATION RATE: 15 BRPM | OXYGEN SATURATION: 96 %

## 2021-12-08 PROCEDURE — 84702 CHORIONIC GONADOTROPIN TEST: CPT

## 2021-12-08 PROCEDURE — 85610 PROTHROMBIN TIME: CPT

## 2021-12-08 PROCEDURE — 85025 COMPLETE CBC W/AUTO DIFF WBC: CPT

## 2021-12-08 PROCEDURE — 87635 SARS-COV-2 COVID-19 AMP PRB: CPT

## 2021-12-08 PROCEDURE — 81001 URINALYSIS AUTO W/SCOPE: CPT

## 2021-12-08 PROCEDURE — 83690 ASSAY OF LIPASE: CPT

## 2021-12-08 PROCEDURE — 85027 COMPLETE CBC AUTOMATED: CPT

## 2021-12-08 PROCEDURE — 93005 ELECTROCARDIOGRAM TRACING: CPT

## 2021-12-08 PROCEDURE — 99232 SBSQ HOSP IP/OBS MODERATE 35: CPT

## 2021-12-08 PROCEDURE — 36415 COLL VENOUS BLD VENIPUNCTURE: CPT

## 2021-12-08 PROCEDURE — 96374 THER/PROPH/DIAG INJ IV PUSH: CPT

## 2021-12-08 PROCEDURE — 85379 FIBRIN DEGRADATION QUANT: CPT

## 2021-12-08 PROCEDURE — 84443 ASSAY THYROID STIM HORMONE: CPT

## 2021-12-08 PROCEDURE — 85730 THROMBOPLASTIN TIME PARTIAL: CPT

## 2021-12-08 PROCEDURE — 80053 COMPREHEN METABOLIC PANEL: CPT

## 2021-12-08 PROCEDURE — 96375 TX/PRO/DX INJ NEW DRUG ADDON: CPT

## 2021-12-08 PROCEDURE — 70360 X-RAY EXAM OF NECK: CPT

## 2021-12-08 PROCEDURE — 82150 ASSAY OF AMYLASE: CPT

## 2021-12-08 PROCEDURE — 99285 EMERGENCY DEPT VISIT HI MDM: CPT | Mod: 25

## 2021-12-08 PROCEDURE — 99239 HOSP IP/OBS DSCHRG MGMT >30: CPT | Mod: GC

## 2021-12-08 PROCEDURE — 88312 SPECIAL STAINS GROUP 1: CPT

## 2021-12-08 PROCEDURE — 88305 TISSUE EXAM BY PATHOLOGIST: CPT

## 2021-12-08 RX ORDER — METOCLOPRAMIDE HCL 10 MG
1 TABLET ORAL
Qty: 30 | Refills: 0
Start: 2021-12-08 | End: 2021-12-22

## 2021-12-08 RX ADMIN — SODIUM CHLORIDE 75 MILLILITER(S): 9 INJECTION INTRAMUSCULAR; INTRAVENOUS; SUBCUTANEOUS at 06:52

## 2021-12-08 RX ADMIN — PANTOPRAZOLE SODIUM 40 MILLIGRAM(S): 20 TABLET, DELAYED RELEASE ORAL at 06:52

## 2021-12-08 NOTE — PROGRESS NOTE ADULT - SUBJECTIVE AND OBJECTIVE BOX
GI PROGRESS NOTE    Patient is a 21y old  Female who presents with a chief complaint of Dysphagia (07 Dec 2021 15:20)      HPI:  Patient is a 21 year female coming from home lives with family with no significant PMHx presents with difficulty swallowing and dyspepsia with nausea for x 2 days. Patient states that she felt really nervous after eating a small-bite sized, shredded piece of steak, denies sharp pieces or jagged edges. Patient endorses feeling as if she still has a lump in her throat, however states she swallowed the whole piece. In addition to esophageal discomfort, she notes a decrease in appetite to solid and liquid foods stating that when she eats she has associated throat and chest discomfort as the food goes down with reflux and burping that follows, which improves when she is sitting upright, denies cough and vomiting. Patient states previous episodes of this happening after eating irritating foods to which she notes she has has many food sensitivities. Of note, patient also endorses feeling tired recently with increased feelings of anxiety with chills, but denies fevers. Patient denies HA, changes in vision, chest pain, SOB, abdominal pain, or changes in urination or bowel movements.      (07 Dec 2021 03:50)          ______________________________________________________________________  PMH/PSH:  PAST MEDICAL & SURGICAL HISTORY:  No pertinent past medical history      ______________________________________________________________________  MEDS:  MEDICATIONS  (STANDING):  pantoprazole    Tablet 40 milliGRAM(s) Oral two times a day  sodium chloride 0.9%. 1000 milliLiter(s) (75 mL/Hr) IV Continuous <Continuous>    MEDICATIONS  (PRN):    ______________________________________________________________________  ALL:   Allergies    Nuts (Rash)  sulfa (Rash)    Intolerances      ______________________________________________________________________  SH: Social History:  Patient lives at home with family, ambulates independently. Mother is at the bedside.    Patient denies smoking history.  Patient denies ETOH use.  Patient denies illicit use of drugs. (07 Dec 2021 03:50)    ______________________________________________________________________  FH:  FAMILY HISTORY:    ______________________________________________________________________  ROS:    CONSTITUTIONAL:  No weight loss, fever, chills, weakness or fatigue.    HEENT:  Eyes:  No visual loss, blurred vision, double vision or yellow sclerae. Ears, Nose, Throat:  No hearing loss, sneezing, congestion, runny nose or sore throat.    SKIN:  No rash or itching.    CARDIOVASCULAR:  No chest pain, chest pressure or chest discomfort. No palpitations or edema.    RESPIRATORY:  No shortness of breath, cough or sputum.    GASTROINTESTINAL:  SEE HPI    GENITOURINARY:  No dysuria, hematuria, urinary frequency    NEUROLOGICAL:  No headache, dizziness, syncope, paralysis, ataxia, numbness or tingling in the extremities. No change in bowel or bladder control.    MUSCULOSKELETAL:  No muscle, back pain, joint pain or stiffness.    HEMATOLOGIC:  No anemia, bleeding or bruising.    LYMPHATICS:  No enlarged nodes. No history of splenectomy.    PSYCHIATRIC:  No history of depression or anxiety.    ENDOCRINOLOGIC:  No reports of sweating, cold or heat intolerance. No polyuria or polydipsia.    ALLERGIES:  No history of asthma, hives, eczema or rhinitis.    GI HPI: Patient seen and examined lying in bed in NAD. Tolerating clear liquids. States still has pressure like sensation when swallowing. No nausea or vomiting. No abdominal pain, no gas or burping.  ______________________________________________________________________  PHYSICAL EXAM:  T(C): 36.7 (12-08-21 @ 05:26), Max: 36.8 (12-07-21 @ 10:51)  HR: 74 (12-08-21 @ 05:26)  BP: 107/68 (12-08-21 @ 05:26)  RR: 15 (12-08-21 @ 05:26)  SpO2: 96% (12-08-21 @ 05:26)  Wt(kg): --      GEN: NAD, anxious  CVS- S1 S2  ABD: soft nontender, non distended, bowel sounds+  LUNGS: clear to auscultation  NEURO: non focal neuro exam;   Extremities: no cyanosis, no calf tenderness, no edema, no clubbing      ______________________________________________________________________  LABS:                        13.2   8.17  )-----------( 235      ( 07 Dec 2021 05:52 )             38.0     12-07    140  |  109<H>  |  10  ----------------------------<  83  3.7   |  24  |  0.66    Ca    8.8      07 Dec 2021 05:52    TPro  7.1  /  Alb  3.7  /  TBili  0.6  /  DBili  x   /  AST  10  /  ALT  18  /  AlkPhos  73  12-07    LIVER FUNCTIONS - ( 07 Dec 2021 05:52 )  Alb: 3.7 g/dL / Pro: 7.1 g/dL / ALK PHOS: 73 U/L / ALT: 18 U/L DA / AST: 10 U/L / GGT: x           ______________________________________________________________________        
PGY-1 Progress Note discussed with attending    PAGER #: [phone]] TILL 5:00 PM  PLEASE CONTACT ON CALL TEAM:  - On Call Team (Please refer to Delmis) FROM 5:00 PM - 8:30PM  - Nightfloat Team FROM 8:30 -7:30 AM    CHIEF COMPLAINT & BRIEF HOSPITAL COURSE: HPI:  Patient is a 21 year female coming from home lives with family with no significant PMHx presents with difficulty swallowing and dyspepsia with nausea for x 2 days. Patient states that she felt really nervous after eating a small-bite sized, shredded piece of steak, denies sharp pieces or jagged edges. Patient endorses feeling as if she still has a lump in her throat, however states she swallowed the whole piece. In addition to esophageal discomfort, she notes a decrease in appetite to solid and liquid foods stating that when she eats she has associated throat and chest discomfort as the food goes down with reflux and burping that follows, which improves when she is sitting upright, denies cough and vomiting. Patient states previous episodes of this happening after eating irritating foods to which she notes she has has many food sensitivities. Of note, patient also endorses feeling tired recently with increased feelings of anxiety with chills, but denies fevers. Patient denies HA, changes in vision, chest pain, SOB, abdominal pain, or changes in urination or bowel movements.      (07 Dec 2021 03:50)      INTERVAL HPI/OVERNIGHT EVENTS: Patient was examined while she was lying in bed, Aox3, responding appropriately to questions, in NAD. Pt denied any chest pain, shortness of breath, nausea, vomiting or abdominal pain.       REVIEW OF SYSTEMS:  CONSTITUTIONAL: No fever, weight loss, or fatigue  RESPIRATORY: No cough, wheezing, chills or hemoptysis; No shortness of breath  CARDIOVASCULAR: No chest pain, palpitations, dizziness, or leg swelling  GASTROINTESTINAL: No abdominal pain. No  vomiting, or hematemesis; No diarrhea or constipation. No melena or hematochezia. Nausea, Sensation of something stuck in throat.   GENITOURINARY: No dysuria or hematuria, urinary frequency  NEUROLOGICAL: No headaches, memory loss, loss of strength, numbness, or tremors  SKIN: No itching, burning, rashes, or lesions     Vital Signs Last 24 Hrs  T(C): 36.7 (07 Dec 2021 12:31), Max: 36.9 (06 Dec 2021 21:45)  T(F): 98.1 (07 Dec 2021 12:31), Max: 98.5 (06 Dec 2021 21:45)  HR: 94 (07 Dec 2021 12:31) (80 - 132)  BP: 108/67 (07 Dec 2021 12:31) (104/62 - 137/93)  BP(mean): --  RR: 18 (07 Dec 2021 12:31) (18 - 18)  SpO2: 95% (07 Dec 2021 12:31) (95% - 99%)    MEDICATIONS  (STANDING):  pantoprazole  Injectable 40 milliGRAM(s) IV Push daily  sodium chloride 0.9%. 1000 milliLiter(s) (75 mL/Hr) IV Continuous <Continuous>    MEDICATIONS  (PRN):      PHYSICAL EXAMINATION:  GENERAL: NAD, well built  HEAD:  Atraumatic, Normocephalic  EYES:  conjunctiva and sclera clear  NECK: Supple, No JVD, Normal thyroid  CHEST/LUNG: Clear to auscultation. Clear to percussion bilaterally; No rales, rhonchi, wheezing, or rubs  HEART: Regular rate and rhythm; No murmurs, rubs, or gallops  ABDOMEN: Soft, Nontender, Nondistended; Bowel sounds present  NERVOUS SYSTEM:  Alert & Oriented X3,    EXTREMITIES:  2+ Peripheral Pulses, No clubbing, cyanosis, or edema  SKIN: warm dry                          13.2   8.17  )-----------( 235      ( 07 Dec 2021 05:52 )             38.0     12-07    140  |  109<H>  |  10  ----------------------------<  83  3.7   |  24  |  0.66    Ca    8.8      07 Dec 2021 05:52    TPro  7.1  /  Alb  3.7  /  TBili  0.6  /  DBili  x   /  AST  10  /  ALT  18  /  AlkPhos  73  12-07    LIVER FUNCTIONS - ( 07 Dec 2021 05:52 )  Alb: 3.7 g/dL / Pro: 7.1 g/dL / ALK PHOS: 73 U/L / ALT: 18 U/L DA / AST: 10 U/L / GGT: x               PT/INR - ( 07 Dec 2021 05:52 )   PT: 14.0 sec;   INR: 1.19 ratio         PTT - ( 07 Dec 2021 05:52 )  PTT:32.0 sec    CAPILLARY BLOOD GLUCOSE      RADIOLOGY & ADDITIONAL TESTS:

## 2021-12-08 NOTE — PROGRESS NOTE ADULT - ASSESSMENT
Patient is a 21 year female coming from home lives with family with no significant PMHx presents with difficulty swallowing and dyspepsia with nausea for x 2 days. Pt is admitted for dysphagia possible esophageal foreign body for EGD eval.
 21F with no significant past medical or surgical history presents with globus sensation.

## 2021-12-08 NOTE — DISCHARGE NOTE NURSING/CASE MANAGEMENT/SOCIAL WORK - NSDCPEFALRISK_GEN_ALL_CORE
For information on Fall & Injury Prevention, visit: [email or web address] OR  [email or web address] OR  https://www.cdc.gov/steadi/patient.html

## 2021-12-08 NOTE — PROGRESS NOTE ADULT - PROBLEM SELECTOR PLAN 1
s/p EGD yesterday with findings suspicious for eosinophilic esophagitis  Pantoprazole 40mg twice daily  Follow-up pathology results   diet as tolerated  outpatient GI follow-up
Esophagus Mucosa There were diffuse longitudinal linear furrows, friability, and concentric rings throughout the esophagus suspicious for eosinophilic esophagitis.   - f/u with GI   - Advancing diet as tolerated.   - Monitor for 24 hours as per GI post procedure.   - Increased PPI to BID.   - Dr. Kelley MOTLEY.

## 2021-12-08 NOTE — DISCHARGE NOTE NURSING/CASE MANAGEMENT/SOCIAL WORK - PATIENT PORTAL LINK FT
You can access the FollowMyHealth Patient Portal offered by [facility] by registering at the following website: [email or web address]. By joining [email or web address]’s FollowMyHealth portal, you will also be able to view your health information using other applications (apps) compatible with our system.

## 2021-12-08 NOTE — PROGRESS NOTE ADULT - ATTENDING COMMENTS
Patient seen and examined. Still some dysphagia but able to tolerate solid food today. Continue Pantoprazole 40mg twice daily. Discussed potential etiologies- suspecting EOE however some PPI responsive subsets. Awaiting biopsy results. Plan for maximizing PPI for 6-8 weeks and repeat EGD to reassess if eosinophilia still present.
s/p EGD- start diet per GI recs. ivf, ppi, if keeps improving than dc planning for tomorrow

## 2021-12-09 PROBLEM — Z78.9 OTHER SPECIFIED HEALTH STATUS: Chronic | Status: ACTIVE | Noted: 2021-12-07

## 2021-12-09 PROBLEM — Z00.00 ENCOUNTER FOR PREVENTIVE HEALTH EXAMINATION: Status: ACTIVE | Noted: 2021-12-09

## 2021-12-09 LAB — SURGICAL PATHOLOGY STUDY: SIGNIFICANT CHANGE UP

## 2021-12-10 ENCOUNTER — EMERGENCY (EMERGENCY)
Facility: HOSPITAL | Age: 21
LOS: 1 days | Discharge: HOME | End: 2021-12-10
Attending: EMERGENCY MEDICINE
Payer: MEDICAID

## 2021-12-10 VITALS
TEMPERATURE: 98 F | OXYGEN SATURATION: 97 % | SYSTOLIC BLOOD PRESSURE: 131 MMHG | HEIGHT: 64.5 IN | HEART RATE: 132 BPM | WEIGHT: 110.01 LBS | RESPIRATION RATE: 20 BRPM | DIASTOLIC BLOOD PRESSURE: 93 MMHG

## 2021-12-10 VITALS — HEART RATE: 86 BPM

## 2021-12-10 LAB
ALBUMIN SERPL ELPH-MCNC: 5.3 G/DL — HIGH (ref 3.3–5)
ALP SERPL-CCNC: 97 U/L — SIGNIFICANT CHANGE UP (ref 40–120)
ALT FLD-CCNC: 13 U/L — SIGNIFICANT CHANGE UP (ref 10–45)
ANION GAP SERPL CALC-SCNC: 18 MMOL/L — HIGH (ref 5–17)
AST SERPL-CCNC: 17 U/L — SIGNIFICANT CHANGE UP (ref 10–40)
BASE EXCESS BLDV CALC-SCNC: -3.3 MMOL/L — LOW (ref -2–2)
BASOPHILS # BLD AUTO: 0 K/UL — SIGNIFICANT CHANGE UP (ref 0–0.2)
BASOPHILS NFR BLD AUTO: 0 % — SIGNIFICANT CHANGE UP (ref 0–2)
BILIRUB SERPL-MCNC: 0.5 MG/DL — SIGNIFICANT CHANGE UP (ref 0.2–1.2)
BUN SERPL-MCNC: 16 MG/DL — SIGNIFICANT CHANGE UP (ref 7–23)
CA-I SERPL-SCNC: 1.28 MMOL/L — SIGNIFICANT CHANGE UP (ref 1.15–1.33)
CALCIUM SERPL-MCNC: 10.3 MG/DL — SIGNIFICANT CHANGE UP (ref 8.4–10.5)
CHLORIDE BLDV-SCNC: 104 MMOL/L — SIGNIFICANT CHANGE UP (ref 96–108)
CHLORIDE SERPL-SCNC: 103 MMOL/L — SIGNIFICANT CHANGE UP (ref 96–108)
CO2 BLDV-SCNC: 24 MMOL/L — SIGNIFICANT CHANGE UP (ref 22–26)
CO2 SERPL-SCNC: 19 MMOL/L — LOW (ref 22–31)
CREAT SERPL-MCNC: 0.7 MG/DL — SIGNIFICANT CHANGE UP (ref 0.5–1.3)
EOSINOPHIL # BLD AUTO: 0 K/UL — SIGNIFICANT CHANGE UP (ref 0–0.5)
EOSINOPHIL NFR BLD AUTO: 0 % — SIGNIFICANT CHANGE UP (ref 0–6)
GAS PNL BLDV: 138 MMOL/L — SIGNIFICANT CHANGE UP (ref 136–145)
GAS PNL BLDV: SIGNIFICANT CHANGE UP
GAS PNL BLDV: SIGNIFICANT CHANGE UP
GLUCOSE BLDV-MCNC: 77 MG/DL — SIGNIFICANT CHANGE UP (ref 70–99)
GLUCOSE SERPL-MCNC: 80 MG/DL — SIGNIFICANT CHANGE UP (ref 70–99)
HCO3 BLDV-SCNC: 23 MMOL/L — SIGNIFICANT CHANGE UP (ref 22–29)
HCT VFR BLD CALC: 42.4 % — SIGNIFICANT CHANGE UP (ref 34.5–45)
HCT VFR BLDA CALC: 46 % — SIGNIFICANT CHANGE UP (ref 34.5–46.5)
HGB BLD CALC-MCNC: 15.4 G/DL — SIGNIFICANT CHANGE UP (ref 11.7–16.1)
HGB BLD-MCNC: 15.1 G/DL — SIGNIFICANT CHANGE UP (ref 11.5–15.5)
LACTATE BLDV-MCNC: 1.5 MMOL/L — SIGNIFICANT CHANGE UP (ref 0.7–2)
LYMPHOCYTES # BLD AUTO: 1.17 K/UL — SIGNIFICANT CHANGE UP (ref 1–3.3)
LYMPHOCYTES # BLD AUTO: 15.8 % — SIGNIFICANT CHANGE UP (ref 13–44)
MANUAL SMEAR VERIFICATION: SIGNIFICANT CHANGE UP
MCHC RBC-ENTMCNC: 31.3 PG — SIGNIFICANT CHANGE UP (ref 27–34)
MCHC RBC-ENTMCNC: 35.6 GM/DL — SIGNIFICANT CHANGE UP (ref 32–36)
MCV RBC AUTO: 87.8 FL — SIGNIFICANT CHANGE UP (ref 80–100)
MONOCYTES # BLD AUTO: 0.19 K/UL — SIGNIFICANT CHANGE UP (ref 0–0.9)
MONOCYTES NFR BLD AUTO: 2.6 % — SIGNIFICANT CHANGE UP (ref 2–14)
NEUTROPHILS # BLD AUTO: 6.04 K/UL — SIGNIFICANT CHANGE UP (ref 1.8–7.4)
NEUTROPHILS NFR BLD AUTO: 81.6 % — HIGH (ref 43–77)
PCO2 BLDV: 43 MMHG — HIGH (ref 39–42)
PH BLDV: 7.33 — SIGNIFICANT CHANGE UP (ref 7.32–7.43)
PLAT MORPH BLD: NORMAL — SIGNIFICANT CHANGE UP
PLATELET # BLD AUTO: 291 K/UL — SIGNIFICANT CHANGE UP (ref 150–400)
PO2 BLDV: 27 MMHG — SIGNIFICANT CHANGE UP (ref 25–45)
POTASSIUM BLDV-SCNC: 4 MMOL/L — SIGNIFICANT CHANGE UP (ref 3.5–5.1)
POTASSIUM SERPL-MCNC: 3.9 MMOL/L — SIGNIFICANT CHANGE UP (ref 3.5–5.3)
POTASSIUM SERPL-SCNC: 3.9 MMOL/L — SIGNIFICANT CHANGE UP (ref 3.5–5.3)
PROT SERPL-MCNC: 8.2 G/DL — SIGNIFICANT CHANGE UP (ref 6–8.3)
RBC # BLD: 4.83 M/UL — SIGNIFICANT CHANGE UP (ref 3.8–5.2)
RBC # FLD: 11.6 % — SIGNIFICANT CHANGE UP (ref 10.3–14.5)
RBC BLD AUTO: NORMAL — SIGNIFICANT CHANGE UP
SAO2 % BLDV: 44.7 % — LOW (ref 67–88)
SODIUM SERPL-SCNC: 140 MMOL/L — SIGNIFICANT CHANGE UP (ref 135–145)
WBC # BLD: 7.4 K/UL — SIGNIFICANT CHANGE UP (ref 3.8–10.5)
WBC # FLD AUTO: 7.4 K/UL — SIGNIFICANT CHANGE UP (ref 3.8–10.5)

## 2021-12-10 PROCEDURE — 93005 ELECTROCARDIOGRAM TRACING: CPT

## 2021-12-10 PROCEDURE — 84132 ASSAY OF SERUM POTASSIUM: CPT

## 2021-12-10 PROCEDURE — 82962 GLUCOSE BLOOD TEST: CPT

## 2021-12-10 PROCEDURE — 82803 BLOOD GASES ANY COMBINATION: CPT

## 2021-12-10 PROCEDURE — 84100 ASSAY OF PHOSPHORUS: CPT

## 2021-12-10 PROCEDURE — 36415 COLL VENOUS BLD VENIPUNCTURE: CPT

## 2021-12-10 PROCEDURE — 82330 ASSAY OF CALCIUM: CPT

## 2021-12-10 PROCEDURE — 82435 ASSAY OF BLOOD CHLORIDE: CPT

## 2021-12-10 PROCEDURE — 83735 ASSAY OF MAGNESIUM: CPT

## 2021-12-10 PROCEDURE — 84295 ASSAY OF SERUM SODIUM: CPT

## 2021-12-10 PROCEDURE — 99283 EMERGENCY DEPT VISIT LOW MDM: CPT

## 2021-12-10 PROCEDURE — 80053 COMPREHEN METABOLIC PANEL: CPT

## 2021-12-10 PROCEDURE — 85014 HEMATOCRIT: CPT

## 2021-12-10 PROCEDURE — 99285 EMERGENCY DEPT VISIT HI MDM: CPT

## 2021-12-10 PROCEDURE — 83605 ASSAY OF LACTIC ACID: CPT

## 2021-12-10 PROCEDURE — 85018 HEMOGLOBIN: CPT

## 2021-12-10 PROCEDURE — 82947 ASSAY GLUCOSE BLOOD QUANT: CPT

## 2021-12-10 PROCEDURE — 85025 COMPLETE CBC W/AUTO DIFF WBC: CPT

## 2021-12-10 RX ORDER — SODIUM CHLORIDE 9 MG/ML
1000 INJECTION INTRAMUSCULAR; INTRAVENOUS; SUBCUTANEOUS ONCE
Refills: 0 | Status: COMPLETED | OUTPATIENT
Start: 2021-12-10 | End: 2021-12-10

## 2021-12-10 RX ADMIN — SODIUM CHLORIDE 1000 MILLILITER(S): 9 INJECTION INTRAMUSCULAR; INTRAVENOUS; SUBCUTANEOUS at 20:44

## 2021-12-10 NOTE — ED PROVIDER NOTE - PATIENT PORTAL LINK FT
You can access the FollowMyHealth Patient Portal offered by [facility] by registering at the following website: [email or web address]. By joining [employer]’s FollowMyHealth portal, you will also be able to view your health information using other applications (apps) compatible with our system.

## 2021-12-10 NOTE — ED PROVIDER NOTE - PROGRESS NOTE DETAILS
Meka Swartz PGY-2 patient drinking small sips of water with occasional burping in ED. Meka Swartz PGY-2 patient drinking small sips of water with occasional burping in ED. patient to follow up with GI as instructed.

## 2021-12-10 NOTE — ED PROVIDER NOTE - NSFOLLOWUPINSTRUCTIONS_ED_ALL_ED_FT
Diet for Stomach Ulcers and Gastritis    WHAT YOU NEED TO KNOW:    What is a diet for stomach ulcers and gastritis? A diet for ulcers and gastritis is a meal plan that limits foods that irritate your stomach. Certain foods may worsen symptoms such as stomach pain, bloating, heartburn, or indigestion.    Which foods should I limit or avoid? You may need to avoid acidic, spicy, or high-fat foods. Not all foods affect everyone the same way. You will need to learn which foods worsen your symptoms and limit those foods. The following are some foods that may worsen ulcer or gastritis symptoms:  •Beverages: ?Whole milk and chocolate milk      ?Hot cocoa and cola      ?Any beverage with caffeine      ?Regular and decaffeinated coffee      ?Peppermint and spearmint tea      ?Green and black tea, with or without caffeine      ?Orange and grapefruit juices      ?Drinks that contain alcohol      •Spices and seasonings: ?Black and red pepper      ?Chili powder      ?Mustard seed and nutmeg      •Other foods: ?Dairy foods made from whole milk or cream      ?Chocolate      ?Spicy or strongly flavored cheeses, such as jalapeno or black pepper      ?Highly seasoned, high-fat meats, such as sausage, salami, calvin, ham, and cold cuts      ?Hot chiles and peppers      ?Tomato products, such as tomato paste, tomato sauce, or tomato juice        Which foods can I eat and drink? Eat a variety of healthy foods from all the food groups. Eat fruits, vegetables, whole grains, and fat-free or low-fat dairy foods. Whole grains include whole-wheat breads, cereals, pasta, and brown rice. Choose lean meats, poultry (chicken and turkey), fish, beans, eggs, and nuts. A healthy meal plan is low in unhealthy fats, salt, and added sugar. Healthy fats include olive oil and canola oil. Ask your dietitian for more information about a healthy meal plan.    Healthy Foods         What other guidelines may be helpful?   •Do not eat right before bedtime. Stop eating at least 2 hours before bedtime.      •Eat small, frequent meals. Your stomach may tolerate small, frequent meals better than large meals.      CARE AGREEMENT:    You have the right to help plan your care. Discuss treatment options with your healthcare provider to decide what care you want to receive. You always have the right to refuse treatment.

## 2021-12-10 NOTE — ED PROVIDER NOTE - NS ED ROS FT
Constitutional: No fever, chills.  Eyes:  No visual changes  ENMT:  +throat pain  Cardiac:  No chest pain  Respiratory:  No cough, SOB  GI:  +nausea,  :  No dysuria, hematuria  MS:  No back pain.  Neuro:  No headache or lightheadedness  Skin:  No skin rash  Except as documented in the HPI,  all other systems are negative.

## 2021-12-10 NOTE — ED PROVIDER NOTE - CLINICAL SUMMARY MEDICAL DECISION MAKING FREE TEXT BOX
22 y/o F hx of GERD p/w 1 week of decrease PO intake, reflux, sensation of "throat closing". vss, on exam +anxious, no tonsillar swelling/posterior pharyngeal erythema. satting well. tolerating po. no acute HEENT emergencies. pending labs to eval for dehydration, electrolyte abnormalities. 20 y/o F hx of GERD p/w 1 week of decrease PO intake, reflux, sensation of "throat closing". vss, on exam +anxious, no tonsillar swelling/posterior pharyngeal erythema. satting well. tolerating po. no acute HEENT emergencies. pending labs to eval for dehydration, electrolyte abnormalities.     Attn - agree with above - pt with esophagitis - symptomatic care.  advised to f/u with GI and PMD.  maalox.  instructed as to reasons to return to ER.

## 2021-12-10 NOTE — ED PROVIDER NOTE - ATTENDING CONTRIBUTION TO CARE
I performed a history and physical exam of the patient and discussed their management with the resident/ACP/medical or PA student. I reviewed the resident/ACP/medical or PA student's note and agree with the documented findings and plan of care.  attn -  see MDM

## 2021-12-10 NOTE — ED ADULT NURSE NOTE - OBJECTIVE STATEMENT
21 year old Female, comes to ER reporting "throat tight" after an episode of eating steak. Patient has recent decreased PO intake, has not been drinking a lot of fluids because she feels her throat is closing. Patient will self induce vomiting to make herself feel better. Upon arrival patient appears extremely anxious, drinking water at bedside with no difficulty, speaking in complete sentences

## 2021-12-10 NOTE — ED ADULT NURSE NOTE - NS PRO PASSIVE SMOKE EXP
Regarding: [place]-high BP  ----- Message from Freda Vis sent at 4/3/2021 11:43 AM CDT -----  Patient Name: Esme Wooten    Full Name of Provider seen for current symptoms:[phone]    Pregnant (If Yes, how long?):NA    Symptoms: high blood pressure- 160/80,  racing heart, perspiring    Do you or any of your household members have the following symptoms:  Fever >100.0#F or >38.0#C: No    New or worsening cough, shortness of breath, sore throat, congestion, or runny nose: No    New onset of nausea, vomiting or diarrhea: No    New onset of loss of taste or smell, chills, repeated shaking with chills, muscle pain, or headache: No    Have you, a household member, or another person you have been in contact with tested positive for COVID-19 in the last 14 days?: No    Call Back #:[phone]    Call Center Account #:[id number]    Which State are you currently located in? (enter State name in Summary field): [place]    Please update the Demographics section with the patients permanent resident address     Emergent COVID-19 Symptoms requiring Nurse Triage:  Trouble breathing, Persistent pain or pressure in the chest, New confusion, Inability to wake or stay awake, Bluish lips or face       Unknown

## 2021-12-10 NOTE — ED ADULT TRIAGE NOTE - CHIEF COMPLAINT QUOTE
"my throat has been getting tighter and tighter for a week"  had normal endoscopy 3 days ago  speaking without difficulty in triage, able to swallow saliva

## 2021-12-10 NOTE — ED PROVIDER NOTE - OBJECTIVE STATEMENT
20 y/o F hx of GERD p/w 1 week of decrease PO intake, reflux, sensation of "throat closing". pt states she has episodes of where she has reflux and usually resolves after vomiting or after relaxation  pt ate steak 1 week ago and began feeling similar sxs, states she was unable to eat or drink due to feeling of throat tightening. went to [facility], had endoscopy which showed possible EOE pending biopsy, sent home on pantoprazole with regular diet.   pt states sxs are not improving, +burping +reflux +weakness +dizziness +throat pain and feeling of closing  no vomiting, diarrhea, abd pain, syncope, palpitations 20 y/o F hx of GERD p/w 1 week of decrease PO intake, reflux, sensation of "throat closing". pt states she has episodes of where she has reflux and usually resolves after vomiting or after relaxation  pt ate steak 1 week ago and began feeling similar sxs, states she was unable to eat or drink due to feeling of throat tightening. went to [facility], had endoscopy which showed possible EOE pending biopsy, sent home on pantoprazole with regular diet.   pt states sxs are not improving, +burping +reflux +weakness +dizziness +throat pain and feeling of closing  no vomiting, diarrhea, abd pain, syncope, palpitations     Attn - pt seen in [id number] - agree with above -

## 2021-12-10 NOTE — ED PROVIDER NOTE - PHYSICAL EXAMINATION
Vital signs reviewed  GENERAL: anxious female  HEAD: NCAT  EYES: Anicteric  ENT: MMM. no pharyngeal erythema/swelling.   NECK: Supple, non tender  RESPIRATORY: Normal respiratory effort. CTA B/L. No wheezing, rales, rhonchi  CARDIOVASCULAR: Regular rate and rhythm  ABDOMEN: Soft. Nondistended. Nontender. No guarding or rebound. No CVA tenderness.  MUSCULOSKELETAL/EXTREMITIES: Brisk cap refill. 2+ radial pulses. No leg edema.  SKIN:  Warm and dry  NEURO: AAOx3. No gross FND. Vital signs reviewed  GENERAL: anxious female  HEAD: NCAT  EYES: Anicteric  ENT: MMM. no pharyngeal erythema/swelling.   NECK: Supple, non tender  RESPIRATORY: Normal respiratory effort. CTA B/L. No wheezing, rales, rhonchi  CARDIOVASCULAR: Regular rate and rhythm  ABDOMEN: Soft. Nondistended. Nontender. No guarding or rebound. No CVA tenderness.  MUSCULOSKELETAL/EXTREMITIES: Brisk cap refill. 2+ radial pulses. No leg edema.  SKIN:  Warm and dry  NEURO: AAOx3. No gross FND.       Attn - alert, nad, no pallor or jaundice, moist mm, lungs - clear, cor - rr, no M, abdo - soft, min epigastric tenderness, no distention, no CVAT.

## 2021-12-13 ENCOUNTER — APPOINTMENT (OUTPATIENT)
Dept: GASTROENTEROLOGY | Facility: CLINIC | Age: 21
End: 2021-12-13
Payer: MEDICAID

## 2021-12-13 VITALS
HEART RATE: 135 BPM | DIASTOLIC BLOOD PRESSURE: 85 MMHG | SYSTOLIC BLOOD PRESSURE: 135 MMHG | TEMPERATURE: 98.3 F | HEIGHT: 65 IN | BODY MASS INDEX: 18.33 KG/M2 | WEIGHT: 110 LBS | OXYGEN SATURATION: 97 %

## 2021-12-13 DIAGNOSIS — R19.8 OTHER SPECIFIED SYMPTOMS AND SIGNS INVOLVING THE DIGESTIVE SYSTEM AND ABDOMEN: ICD-10-CM

## 2021-12-13 PROBLEM — E78.5 HYPERLIPIDEMIA, UNSPECIFIED: Chronic | Status: ACTIVE | Noted: 2021-12-10

## 2021-12-13 PROBLEM — I10 ESSENTIAL (PRIMARY) HYPERTENSION: Chronic | Status: ACTIVE | Noted: 2021-12-10

## 2021-12-13 PROCEDURE — 99204 OFFICE O/P NEW MOD 45 MIN: CPT

## 2021-12-13 NOTE — HISTORY OF PRESENT ILLNESS
[de-identified] : 21F with pmhx of eosinophilic esophagitis presenting for evaluation. Seen in hospital last week for globus sensation, dysphagia after eating steak. Had EGD showing no food bolus but findings consistent with EOE with narrowing/rings throughout the esophagus. Biopsies were consistent with EOE. Started on Pantoprazole 40mg twice daily in case PPI responsive. Pt however states on PPI BID, still persistent symptoms without any improvement. Notes anxious about her dysphagia symptoms and globus sensation, tearful on exam. Has been tolerating liquids and soft foods at home. Denies any abd pain, n/v/d/c, melena, hematochezia, fever/chills, or other issues. Was in ER again 3 days ago for same complaints, dced home. \par \par PMHx: Above.\par Medications: PPI BID.\par Allergies: Sulfa, Eggs, Nuts. \par Surgical Hx: Denies.\par SH: Denies tobacco, etoh, or drug use.\par FH: Denies fhx of GI disorder.

## 2021-12-13 NOTE — ASSESSMENT
[FreeTextEntry1] : 21F with pmhx of eosinophilic esophagitis presenting for evaluation. Seen in hospital last week for globus sensation, dysphagia after eating steak. Had EGD showing no food bolus but findings consistent with EOE with narrowing/rings throughout the esophagus. Reviewed pathology, consistent with EOE. Started on PPI BID in case PPI responsive however pt w/ still ongoing severe symptoms, constant globus sensation, tearful on exam, in ER again 3 days ago. \par - Start Fluticasone 220mcg SWALLOWED twice daily. \par - Continue Pantoprazole 40mg twice daily.\par F/u 1-2 weeks.

## 2021-12-13 NOTE — PHYSICAL EXAM
[General Appearance - Alert] : alert [General Appearance - In No Acute Distress] : in no acute distress [Sclera] : the sclera and conjunctiva were normal [PERRL With Normal Accommodation] : pupils were equal in size, round, and reactive to light [Extraocular Movements] : extraocular movements were intact [Outer Ear] : the ears and nose were normal in appearance [Oropharynx] : the oropharynx was normal [Neck Appearance] : the appearance of the neck was normal [Jugular Venous Distention Increased] : there was no jugular-venous distention [Auscultation Breath Sounds / Voice Sounds] : lungs were clear to auscultation bilaterally [Heart Rate And Rhythm] : heart rate was normal and rhythm regular [Heart Sounds] : normal S1 and S2 [Heart Sounds Gallop] : no gallops [Murmurs] : no murmurs [Heart Sounds Pericardial Friction Rub] : no pericardial rub [Edema] : there was no peripheral edema [Bowel Sounds] : normal bowel sounds [Abdomen Soft] : soft [Abdomen Tenderness] : non-tender [] : no hepato-splenomegaly [Abdomen Mass (___ Cm)] : no abdominal mass palpated [Abnormal Walk] : normal gait [Skin Color & Pigmentation] : normal skin color and pigmentation [No Focal Deficits] : no focal deficits [Oriented To Time, Place, And Person] : oriented to person, place, and time

## 2021-12-20 ENCOUNTER — APPOINTMENT (OUTPATIENT)
Dept: GASTROENTEROLOGY | Facility: CLINIC | Age: 21
End: 2021-12-20

## 2022-01-03 ENCOUNTER — APPOINTMENT (OUTPATIENT)
Dept: GASTROENTEROLOGY | Facility: CLINIC | Age: 22
End: 2022-01-03

## 2022-02-04 ENCOUNTER — APPOINTMENT (OUTPATIENT)
Dept: GASTROENTEROLOGY | Facility: CLINIC | Age: 22
End: 2022-02-04
Payer: MEDICAID

## 2022-02-04 VITALS
BODY MASS INDEX: 16.66 KG/M2 | TEMPERATURE: 96.3 F | DIASTOLIC BLOOD PRESSURE: 82 MMHG | OXYGEN SATURATION: 98 % | HEIGHT: 65 IN | WEIGHT: 100 LBS | SYSTOLIC BLOOD PRESSURE: 118 MMHG | HEART RATE: 124 BPM

## 2022-02-04 DIAGNOSIS — R13.10 DYSPHAGIA, UNSPECIFIED: ICD-10-CM

## 2022-02-04 DIAGNOSIS — R12 HEARTBURN: ICD-10-CM

## 2022-02-04 DIAGNOSIS — Z01.818 ENCOUNTER FOR OTHER PREPROCEDURAL EXAMINATION: ICD-10-CM

## 2022-02-04 DIAGNOSIS — K20.0 EOSINOPHILIC ESOPHAGITIS: ICD-10-CM

## 2022-02-04 PROCEDURE — 99213 OFFICE O/P EST LOW 20 MIN: CPT

## 2022-02-04 RX ORDER — OMEPRAZOLE 40 MG/1
40 CAPSULE, DELAYED RELEASE ORAL
Qty: 60 | Refills: 2 | Status: ACTIVE | COMMUNITY
Start: 2022-02-02

## 2022-02-04 RX ORDER — NYSTATIN 100000 [USP'U]/ML
100000 SUSPENSION ORAL 4 TIMES DAILY
Qty: 100 | Refills: 0 | Status: ACTIVE | COMMUNITY
Start: 2022-02-04 | End: 1900-01-01

## 2022-02-04 NOTE — HISTORY OF PRESENT ILLNESS
[de-identified] : 21F with pmhx of EOE presenting for follow-up. States since starting flovent BID, has been feeling much better. Globus sensation much improved and eating better with less dysphagia. Also notes has been avoiding triggering food groups (i.e. wheat, eggs, nuts) which has helped. Switched Pantoprazole to Omeprazole 40 mg once daily and tolerating well. Notes some throat soreness and white plaque on the tongue. Denies any odynophagia, melena, hematochezia, fever/chills, n/v/d/c, or other issues.

## 2022-02-04 NOTE — ASSESSMENT
[FreeTextEntry1] : 21F with pmhx of EOE presenting for follow-up. States since starting flovent BID, has been feeling much better. Globus sensation much improved and eating better with less dysphagia. Also notes has been avoiding triggering food groups (i.e. wheat, eggs, nuts) which has helped. Switched Pantoprazole to Omeprazole 40 mg once daily and tolerating well. Notes some throat soreness and tongue plaque, possible candida.\par - Nystatin swish/swallow for one week.\par - Schedule repeat EGD for biopsy possible dilation. \par - Continue Flovent 2 puffs swallowed BID.\par - Continue Omeprazole 40mg once daily.\par - Check tissue transglutaminase IgA and serum IgA level to evaluate for celiac given some symptoms particularly with gluten.\par \par Of note, as from prior note, patient was hospitalized initially and diagnosed by me in 12/2021 with severe eosinophilic esophagitis with multiple rings and strictures in the esophagus. Given poor po intake at that time, I feel it was medically necessary for her to be admitted then. She it doing much better on medical therapy for her EOE.

## 2022-02-07 ENCOUNTER — APPOINTMENT (OUTPATIENT)
Dept: GASTROENTEROLOGY | Facility: CLINIC | Age: 22
End: 2022-02-07
Payer: MEDICAID

## 2022-02-22 ENCOUNTER — OUTPATIENT (OUTPATIENT)
Dept: OUTPATIENT SERVICES | Facility: HOSPITAL | Age: 22
LOS: 1 days | End: 2022-02-22
Payer: MEDICAID

## 2022-02-22 ENCOUNTER — RESULT REVIEW (OUTPATIENT)
Age: 22
End: 2022-02-22

## 2022-02-22 ENCOUNTER — APPOINTMENT (OUTPATIENT)
Dept: GASTROENTEROLOGY | Facility: HOSPITAL | Age: 22
End: 2022-02-22

## 2022-02-22 DIAGNOSIS — K20.0 EOSINOPHILIC ESOPHAGITIS: ICD-10-CM

## 2022-02-22 DIAGNOSIS — R13.10 DYSPHAGIA, UNSPECIFIED: ICD-10-CM

## 2022-02-22 LAB — HCG UR QL: NEGATIVE — SIGNIFICANT CHANGE UP

## 2022-02-22 PROCEDURE — 88312 SPECIAL STAINS GROUP 1: CPT

## 2022-02-22 PROCEDURE — 88305 TISSUE EXAM BY PATHOLOGIST: CPT | Mod: 26

## 2022-02-22 PROCEDURE — 43239 EGD BIOPSY SINGLE/MULTIPLE: CPT

## 2022-02-22 PROCEDURE — 88312 SPECIAL STAINS GROUP 1: CPT | Mod: 26

## 2022-02-22 PROCEDURE — 88305 TISSUE EXAM BY PATHOLOGIST: CPT

## 2022-02-22 PROCEDURE — 81025 URINE PREGNANCY TEST: CPT

## 2022-02-22 DEVICE — CATH ESOPH DIL 8 ATM 6FR10-12M: Type: IMPLANTABLE DEVICE | Status: FUNCTIONAL

## 2022-02-22 DEVICE — CATH BALLOON DIL 6-8MM: Type: IMPLANTABLE DEVICE | Status: FUNCTIONAL

## 2022-02-22 DEVICE — CATH ESOPH DIL 9 ATM 6FR 8-10MM: Type: IMPLANTABLE DEVICE | Status: FUNCTIONAL

## 2022-02-24 LAB — SURGICAL PATHOLOGY STUDY: SIGNIFICANT CHANGE UP

## 2022-06-21 ENCOUNTER — NON-APPOINTMENT (OUTPATIENT)
Age: 22
End: 2022-06-21

## 2022-09-23 RX ORDER — FLUTICASONE PROPIONATE 220 UG/1
220 AEROSOL, METERED RESPIRATORY (INHALATION) TWICE DAILY
Qty: 1 | Refills: 5 | Status: ACTIVE | COMMUNITY
Start: 2021-12-13 | End: 1900-01-01

## 2023-10-11 ENCOUNTER — RX RENEWAL (OUTPATIENT)
Age: 23
End: 2023-10-11

## 2023-10-17 ENCOUNTER — APPOINTMENT (OUTPATIENT)
Dept: GASTROENTEROLOGY | Facility: CLINIC | Age: 23
End: 2023-10-17

## 2023-12-29 RX ORDER — OMEPRAZOLE 20 MG/1
20 CAPSULE, DELAYED RELEASE ORAL
Qty: 30 | Refills: 0 | Status: ACTIVE | COMMUNITY
Start: 2022-06-21 | End: 1900-01-01

## 2024-01-02 ENCOUNTER — APPOINTMENT (OUTPATIENT)
Dept: GASTROENTEROLOGY | Facility: CLINIC | Age: 24
End: 2024-01-02
